# Patient Record
(demographics unavailable — no encounter records)

---

## 2025-06-12 NOTE — HISTORY OF PRESENT ILLNESS
[FreeTextEntry1] : Establish Care. [de-identified] : The patient is a 50 y/o F with PMHx of anemia, depression, ADD, IBS-D, Asthma (diagnosed in high school) who presents today to establish care.   h/o Depression/ ADD- was taking medications in the past stopped taking it when she got pregnant in 2016. denies any depression, denies SI/HI.   #IBS-D- stable. follow FODMAP diet. had colonoscopy  with normal findings.   #Asthma- well controlled. never intubated, no hospitalizations. Had PFTs long time ago. has not used inhaler in a long time. requesting refill on albuterol inhaler. has one at home that is .   Patient had Tick bite in April. Went to [facility] in [facility]. Lyme test was done it was negative. Patient completed the course of Doxycycline. will repeat Ab test  denies fever, chills, denies any rash, denies joint pain, denies chest pain and denies palpitations.   "I don't like to go to the doctors"   she said she will take the referrals but not interested to get Mammo, not interested to see gyn.   Preventative Care- Cervical cancer Screen- gynecology referral today Breast Cancer Screen- Mammogram last Mammogram was 10 years ago.  Colon Cancer Screen- colonoscopy 2019- Normal.   Immunizations- Covid- x2 Influenza- permanently deferred. Tdap- UTD Pneumococcal- declined  Shingles- declined.

## 2025-06-12 NOTE — HISTORY OF PRESENT ILLNESS
[FreeTextEntry1] : Establish Care. [de-identified] : The patient is a 52 y/o F with PMHx of anemia, depression, ADD, IBS-D, Asthma (diagnosed in high school) who presents today to establish care.   h/o Depression/ ADD- was taking medications in the past stopped taking it when she got pregnant in 2016. denies any depression, denies SI/HI.   #IBS-D- stable. follow FODMAP diet. had colonoscopy  with normal findings.   #Asthma- well controlled. never intubated, no hospitalizations. Had PFTs long time ago. has not used inhaler in a long time. requesting refill on albuterol inhaler. has one at home that is .   Patient had Tick bite in April. Went to [facility] in [place] [employer] [facility] [place]. Lyme test was done it was negative. Patient completed the course of Doxycycline. will repeat Ab test  denies fever, chills, denies any rash, denies joint pain, denies chest pain and denies palpitations.   "I don't like to go to the doctors"   she said she will take the referrals but not interested to get Mammo, not interested to see gyn.   Preventative Care- Cervical cancer Screen- gynecology referral today Breast Cancer Screen- Mammogram last Mammogram was 10 years ago.  Colon Cancer Screen- colonoscopy 2019- Normal.   Immunizations- Covid- x2 Influenza- permanently deferred. Tdap- UTD Pneumococcal- declined  Shingles- declined.

## 2025-06-12 NOTE — HEALTH RISK ASSESSMENT
[Good] : ~his/her~  mood as  good [No] : In the past 12 months have you used drugs other than those required for medical reasons? No [No falls in past year] : Patient reported no falls in the past year [0] : 2) Feeling down, depressed, or hopeless: Not at all (0) [PHQ-2 Negative - No further assessment needed] : PHQ-2 Negative - No further assessment needed [HIV test declined] : HIV test declined [Hepatitis C test declined] : Hepatitis C test declined [With Family] : lives with family [Employed] : employed [Single] : single [Fully functional (bathing, dressing, toileting, transferring, walking, feeding)] : Fully functional (bathing, dressing, toileting, transferring, walking, feeding) [Fully functional (using the telephone, shopping, preparing meals, housekeeping, doing laundry, using] : Fully functional and needs no help or supervision to perform IADLs (using the telephone, shopping, preparing meals, housekeeping, doing laundry, using transportation, managing medications and managing finances) [Never] : Never [Audit-CScore] : 0 [[id number]] : 0 [Sexually Active] : not sexually active [Reports changes in hearing] : Reports no changes in hearing [Reports changes in vision] : Reports no changes in vision [Reports changes in dental health] : Reports no changes in dental health [FreeTextEntry2] : teacher

## 2025-06-12 NOTE — ASSESSMENT
[FreeTextEntry1] : #HCM Routine blood work ordered declined STI screen   Preventative Care- Cervical cancer Screen- gynecology referral today Breast Cancer Screen- Mammogram last Mammogram was 10 years ago.  Colon Cancer Screen- colonoscopy 2019- Normal.   Immunizations- Covid- x2 Influenza- permanently deferred. Tdap- UTD Pneumococcal- declined  Shingles- declined.   All questions and concerns addressed.  RTC next year or as needed

## 2025-06-12 NOTE — PHYSICAL EXAM
[Normal Sclera/Conjunctiva] : normal sclera/conjunctiva [PERRL] : pupils equal round and reactive to light [EOMI] : extraocular movements intact [Supple] : supple [No Respiratory Distress] : no respiratory distress  [No Accessory Muscle Use] : no accessory muscle use [Clear to Auscultation] : lungs were clear to auscultation bilaterally [Normal Rate] : normal rate  [Regular Rhythm] : with a regular rhythm [Normal S1, S2] : normal S1 and S2 [Pedal Pulses Present] : the pedal pulses are present [No Edema] : there was no peripheral edema [Soft] : abdomen soft [Non Tender] : non-tender [Non-distended] : non-distended [Normal Supraclavicular Nodes] : no supraclavicular lymphadenopathy [Normal Posterior Cervical Nodes] : no posterior cervical lymphadenopathy [Normal Anterior Cervical Nodes] : no anterior cervical lymphadenopathy [No CVA Tenderness] : no CVA  tenderness [No Spinal Tenderness] : no spinal tenderness [No Rash] : no rash [Normal] : normal gait, coordination grossly intact, no focal deficits and deep tendon reflexes were 2+ and symmetric [Normal Affect] : the affect was normal [Alert and Oriented x3] : oriented to person, place, and time [Normal Mood] : the mood was normal

## 2025-06-12 NOTE — HEALTH RISK ASSESSMENT
[Good] : ~his/her~  mood as  good [No] : In the past 12 months have you used drugs other than those required for medical reasons? No [No falls in past year] : Patient reported no falls in the past year [0] : 2) Feeling down, depressed, or hopeless: Not at all (0) [PHQ-2 Negative - No further assessment needed] : PHQ-2 Negative - No further assessment needed [HIV test declined] : HIV test declined [Hepatitis C test declined] : Hepatitis C test declined [With Family] : lives with family [Employed] : employed [Single] : single [Fully functional (bathing, dressing, toileting, transferring, walking, feeding)] : Fully functional (bathing, dressing, toileting, transferring, walking, feeding) [Fully functional (using the telephone, shopping, preparing meals, housekeeping, doing laundry, using] : Fully functional and needs no help or supervision to perform IADLs (using the telephone, shopping, preparing meals, housekeeping, doing laundry, using transportation, managing medications and managing finances) [Never] : Never [Audit-CScore] : 0 [[id number]] : 0 [Sexually Active] : not sexually active [Reports changes in hearing] : Reports no changes in hearing [Reports changes in vision] : Reports no changes in vision [Reports changes in dental health] : Reports no changes in dental health [FreeTextEntry2] : teacher